# Patient Record
(demographics unavailable — no encounter records)

---

## 2025-03-18 NOTE — HISTORY OF PRESENT ILLNESS
[de-identified] : This is a 41yo male presenting with complaint of left knee pain. 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 Pain worsens with bending the knee quickly, weightbearing and stairs. He feels his knee will give way due to pain. Patient reports he typically does not take anything for pain, but pain has recently gotten to point where he may need to do so which is what prompted him to get looked at. He states he is limping due to pain and pain is limiting ability to perform ADL comfortably.

## 2025-03-18 NOTE — PHYSICAL EXAM
[de-identified] : General Exam: Appearance: well developed and nourished Orientation: Alert and oriented to person, place, time. Mood: mood and affect well-adjusted, pleasant and cooperative, appropriate for clinical and encounter circumstances  Left Knee:  Skin: intact, no rashes or lesions. Inspection: +TTP medial joint line; normal alignment, no deformity, no warmth, no masses.   Quadriceps: Strength: 5/5, normal muscle tone.  Hamstring: Strength: 5/5, normal muscle tone.  Special tests: +David's medially [de-identified] : limited ROM from about 0-100 degrees [de-identified] : 3 views of the left knee show mild OA, no acute findings

## 2025-03-18 NOTE — DISCUSSION/SUMMARY
[de-identified] : 43yo male presenting with medial sided left knee pain, +David's on exam, +mechanical symptoms of buckling and restricted ROM to knee on exam. He was dispensed a hinged knee brace to wear with work for support and prescribed meloxicam to take PRN for pain relief. I have ordered an MRI to further evaluate for internal derangement. Follow up after MRI.  The patient was given the opportunity to ask questions and all questions were answered to their satisfaction.